# Patient Record
Sex: FEMALE | Race: BLACK OR AFRICAN AMERICAN | NOT HISPANIC OR LATINO | URBAN - METROPOLITAN AREA
[De-identification: names, ages, dates, MRNs, and addresses within clinical notes are randomized per-mention and may not be internally consistent; named-entity substitution may affect disease eponyms.]

---

## 2024-01-13 ENCOUNTER — CLINICAL SUPPORT (OUTPATIENT)
Dept: EMERGENCY MEDICINE | Facility: HOSPITAL | Age: 44
End: 2024-01-13

## 2024-01-13 ENCOUNTER — APPOINTMENT (OUTPATIENT)
Dept: RADIOLOGY | Facility: HOSPITAL | Age: 44
End: 2024-01-13

## 2024-01-13 ENCOUNTER — HOSPITAL ENCOUNTER (EMERGENCY)
Facility: HOSPITAL | Age: 44
Discharge: HOME | End: 2024-01-13
Attending: EMERGENCY MEDICINE

## 2024-01-13 VITALS
SYSTOLIC BLOOD PRESSURE: 124 MMHG | HEART RATE: 78 BPM | DIASTOLIC BLOOD PRESSURE: 62 MMHG | RESPIRATION RATE: 18 BRPM | TEMPERATURE: 98.2 F | HEIGHT: 66 IN | BODY MASS INDEX: 28.61 KG/M2 | OXYGEN SATURATION: 98 % | WEIGHT: 178 LBS

## 2024-01-13 DIAGNOSIS — B96.89 BACTERIAL VAGINOSIS: ICD-10-CM

## 2024-01-13 DIAGNOSIS — N93.9 ABNORMAL UTERINE BLEEDING: ICD-10-CM

## 2024-01-13 DIAGNOSIS — E04.1 THYROID NODULE: ICD-10-CM

## 2024-01-13 DIAGNOSIS — R10.9 BILATERAL FLANK PAIN: ICD-10-CM

## 2024-01-13 DIAGNOSIS — R55 SYNCOPE AND COLLAPSE: Primary | ICD-10-CM

## 2024-01-13 DIAGNOSIS — N76.0 BACTERIAL VAGINOSIS: ICD-10-CM

## 2024-01-13 LAB
ALBUMIN SERPL BCP-MCNC: 4 G/DL (ref 3.4–5)
ALP SERPL-CCNC: 62 U/L (ref 33–110)
ALT SERPL W P-5'-P-CCNC: 7 U/L (ref 7–45)
ANION GAP SERPL CALC-SCNC: 13 MMOL/L (ref 10–20)
APPEARANCE UR: CLEAR
AST SERPL W P-5'-P-CCNC: 9 U/L (ref 9–39)
BASOPHILS # BLD AUTO: 0.04 X10*3/UL (ref 0–0.1)
BASOPHILS NFR BLD AUTO: 0.4 %
BILIRUB SERPL-MCNC: 0.5 MG/DL (ref 0–1.2)
BILIRUB UR STRIP.AUTO-MCNC: NEGATIVE MG/DL
BUN SERPL-MCNC: 12 MG/DL (ref 6–23)
CALCIUM SERPL-MCNC: 9.8 MG/DL (ref 8.6–10.6)
CARDIAC TROPONIN I PNL SERPL HS: <3 NG/L (ref 0–34)
CARDIAC TROPONIN I PNL SERPL HS: <3 NG/L (ref 0–34)
CHLORIDE SERPL-SCNC: 106 MMOL/L (ref 98–107)
CLUE CELLS SPEC QL WET PREP: PRESENT
CO2 SERPL-SCNC: 21 MMOL/L (ref 21–32)
COLOR UR: ABNORMAL
CREAT SERPL-MCNC: 0.71 MG/DL (ref 0.5–1.05)
EGFRCR SERPLBLD CKD-EPI 2021: >90 ML/MIN/1.73M*2
EOSINOPHIL # BLD AUTO: 0.16 X10*3/UL (ref 0–0.7)
EOSINOPHIL NFR BLD AUTO: 1.5 %
ERYTHROCYTE [DISTWIDTH] IN BLOOD BY AUTOMATED COUNT: 13.1 % (ref 11.5–14.5)
GLUCOSE SERPL-MCNC: 104 MG/DL (ref 74–99)
GLUCOSE UR STRIP.AUTO-MCNC: NEGATIVE MG/DL
HCT VFR BLD AUTO: 34.6 % (ref 36–46)
HGB BLD-MCNC: 12.6 G/DL (ref 12–16)
IMM GRANULOCYTES # BLD AUTO: 0.02 X10*3/UL (ref 0–0.7)
IMM GRANULOCYTES NFR BLD AUTO: 0.2 % (ref 0–0.9)
KETONES UR STRIP.AUTO-MCNC: NEGATIVE MG/DL
LEUKOCYTE ESTERASE UR QL STRIP.AUTO: NEGATIVE
LIPASE SERPL-CCNC: 19 U/L (ref 9–82)
LYMPHOCYTES # BLD AUTO: 1.98 X10*3/UL (ref 1.2–4.8)
LYMPHOCYTES NFR BLD AUTO: 19 %
MAGNESIUM SERPL-MCNC: 1.93 MG/DL (ref 1.6–2.4)
MCH RBC QN AUTO: 29.4 PG (ref 26–34)
MCHC RBC AUTO-ENTMCNC: 36.4 G/DL (ref 32–36)
MCV RBC AUTO: 81 FL (ref 80–100)
MONOCYTES # BLD AUTO: 0.87 X10*3/UL (ref 0.1–1)
MONOCYTES NFR BLD AUTO: 8.3 %
MUCOUS THREADS #/AREA URNS AUTO: NORMAL /LPF
NEUTROPHILS # BLD AUTO: 7.35 X10*3/UL (ref 1.2–7.7)
NEUTROPHILS NFR BLD AUTO: 70.6 %
NITRITE UR QL STRIP.AUTO: NEGATIVE
NRBC BLD-RTO: 0 /100 WBCS (ref 0–0)
PH UR STRIP.AUTO: 5 [PH]
PLATELET # BLD AUTO: 303 X10*3/UL (ref 150–450)
POTASSIUM SERPL-SCNC: 3.8 MMOL/L (ref 3.5–5.3)
PREGNANCY TEST URINE, POC: NEGATIVE
PROT SERPL-MCNC: 8.1 G/DL (ref 6.4–8.2)
PROT UR STRIP.AUTO-MCNC: NEGATIVE MG/DL
RBC # BLD AUTO: 4.28 X10*6/UL (ref 4–5.2)
RBC # UR STRIP.AUTO: ABNORMAL /UL
RBC #/AREA URNS AUTO: NORMAL /HPF
SODIUM SERPL-SCNC: 136 MMOL/L (ref 136–145)
SP GR UR STRIP.AUTO: 1.01
T VAGINALIS SPEC QL WET PREP: ABNORMAL
TRICHOMONAS REFLEX COMMENT: ABNORMAL
TSH SERPL-ACNC: 1.48 MIU/L (ref 0.44–3.98)
UROBILINOGEN UR STRIP.AUTO-MCNC: <2 MG/DL
WBC # BLD AUTO: 10.4 X10*3/UL (ref 4.4–11.3)
WBC #/AREA URNS AUTO: NORMAL /HPF
WBC VAG QL WET PREP: ABNORMAL
YEAST VAG QL WET PREP: ABNORMAL

## 2024-01-13 PROCEDURE — 96374 THER/PROPH/DIAG INJ IV PUSH: CPT | Mod: 59

## 2024-01-13 PROCEDURE — 2500000005 HC RX 250 GENERAL PHARMACY W/O HCPCS

## 2024-01-13 PROCEDURE — 93005 ELECTROCARDIOGRAM TRACING: CPT

## 2024-01-13 PROCEDURE — 36415 COLL VENOUS BLD VENIPUNCTURE: CPT

## 2024-01-13 PROCEDURE — 2500000001 HC RX 250 WO HCPCS SELF ADMINISTERED DRUGS (ALT 637 FOR MEDICARE OP)

## 2024-01-13 PROCEDURE — 83735 ASSAY OF MAGNESIUM: CPT

## 2024-01-13 PROCEDURE — 96361 HYDRATE IV INFUSION ADD-ON: CPT

## 2024-01-13 PROCEDURE — 71275 CT ANGIOGRAPHY CHEST: CPT | Performed by: RADIOLOGY

## 2024-01-13 PROCEDURE — 85025 COMPLETE CBC W/AUTO DIFF WBC: CPT

## 2024-01-13 PROCEDURE — 2550000001 HC RX 255 CONTRASTS: Performed by: EMERGENCY MEDICINE

## 2024-01-13 PROCEDURE — 74177 CT ABD & PELVIS W/CONTRAST: CPT

## 2024-01-13 PROCEDURE — 71275 CT ANGIOGRAPHY CHEST: CPT

## 2024-01-13 PROCEDURE — 74177 CT ABD & PELVIS W/CONTRAST: CPT | Performed by: RADIOLOGY

## 2024-01-13 PROCEDURE — 96375 TX/PRO/DX INJ NEW DRUG ADDON: CPT | Mod: 59

## 2024-01-13 PROCEDURE — 87661 TRICHOMONAS VAGINALIS AMPLIF: CPT

## 2024-01-13 PROCEDURE — 81003 URINALYSIS AUTO W/O SCOPE: CPT

## 2024-01-13 PROCEDURE — 84443 ASSAY THYROID STIM HORMONE: CPT

## 2024-01-13 PROCEDURE — 80053 COMPREHEN METABOLIC PANEL: CPT

## 2024-01-13 PROCEDURE — 87800 DETECT AGNT MULT DNA DIREC: CPT

## 2024-01-13 PROCEDURE — 99285 EMERGENCY DEPT VISIT HI MDM: CPT

## 2024-01-13 PROCEDURE — 2500000004 HC RX 250 GENERAL PHARMACY W/ HCPCS (ALT 636 FOR OP/ED)

## 2024-01-13 PROCEDURE — 83690 ASSAY OF LIPASE: CPT

## 2024-01-13 PROCEDURE — 87210 SMEAR WET MOUNT SALINE/INK: CPT

## 2024-01-13 PROCEDURE — 84484 ASSAY OF TROPONIN QUANT: CPT

## 2024-01-13 PROCEDURE — 99285 EMERGENCY DEPT VISIT HI MDM: CPT | Performed by: EMERGENCY MEDICINE

## 2024-01-13 RX ORDER — METRONIDAZOLE 500 MG/1
TABLET ORAL
Status: COMPLETED
Start: 2024-01-13 | End: 2024-01-13

## 2024-01-13 RX ORDER — KETOROLAC TROMETHAMINE 10 MG/1
10 TABLET, FILM COATED ORAL EVERY 6 HOURS PRN
Qty: 20 TABLET | Refills: 0 | Status: SHIPPED | OUTPATIENT
Start: 2024-01-13 | End: 2024-01-18

## 2024-01-13 RX ORDER — METRONIDAZOLE 500 MG/1
500 TABLET ORAL ONCE
Status: COMPLETED | OUTPATIENT
Start: 2024-01-13 | End: 2024-01-13

## 2024-01-13 RX ORDER — KETOROLAC TROMETHAMINE 15 MG/ML
15 INJECTION, SOLUTION INTRAMUSCULAR; INTRAVENOUS ONCE
Status: COMPLETED | OUTPATIENT
Start: 2024-01-13 | End: 2024-01-13

## 2024-01-13 RX ORDER — MORPHINE SULFATE 4 MG/ML
4 INJECTION INTRAVENOUS ONCE
Status: COMPLETED | OUTPATIENT
Start: 2024-01-13 | End: 2024-01-13

## 2024-01-13 RX ORDER — LIDOCAINE 560 MG/1
2 PATCH PERCUTANEOUS; TOPICAL; TRANSDERMAL DAILY
Status: DISCONTINUED | OUTPATIENT
Start: 2024-01-13 | End: 2024-01-13 | Stop reason: HOSPADM

## 2024-01-13 RX ORDER — CYCLOBENZAPRINE HCL 10 MG
5 TABLET ORAL ONCE
Status: COMPLETED | OUTPATIENT
Start: 2024-01-13 | End: 2024-01-13

## 2024-01-13 RX ORDER — KETOROLAC TROMETHAMINE 15 MG/ML
INJECTION, SOLUTION INTRAMUSCULAR; INTRAVENOUS
Status: COMPLETED
Start: 2024-01-13 | End: 2024-01-13

## 2024-01-13 RX ORDER — CYCLOBENZAPRINE HCL 5 MG
5 TABLET ORAL 3 TIMES DAILY
Qty: 30 TABLET | Refills: 0 | Status: SHIPPED | OUTPATIENT
Start: 2024-01-13 | End: 2024-01-23

## 2024-01-13 RX ORDER — METRONIDAZOLE 500 MG/1
500 TABLET ORAL 2 TIMES DAILY
Qty: 14 TABLET | Refills: 0 | Status: SHIPPED | OUTPATIENT
Start: 2024-01-13 | End: 2024-01-20

## 2024-01-13 RX ORDER — LIDOCAINE 50 MG/G
1 PATCH TOPICAL DAILY
Qty: 10 PATCH | Refills: 0 | Status: SHIPPED | OUTPATIENT
Start: 2024-01-13

## 2024-01-13 RX ADMIN — KETOROLAC TROMETHAMINE 15 MG: 15 INJECTION, SOLUTION INTRAMUSCULAR; INTRAVENOUS at 14:35

## 2024-01-13 RX ADMIN — IOHEXOL 100 ML: 350 INJECTION, SOLUTION INTRAVENOUS at 16:01

## 2024-01-13 RX ADMIN — SODIUM CHLORIDE 1000 ML: 9 INJECTION, SOLUTION INTRAVENOUS at 14:45

## 2024-01-13 RX ADMIN — CYCLOBENZAPRINE 5 MG: 10 TABLET, FILM COATED ORAL at 14:36

## 2024-01-13 RX ADMIN — METRONIDAZOLE 500 MG: 500 TABLET, FILM COATED ORAL at 18:20

## 2024-01-13 RX ADMIN — MORPHINE SULFATE 4 MG: 4 INJECTION INTRAVENOUS at 15:40

## 2024-01-13 RX ADMIN — LIDOCAINE 2 PATCH: 4 PATCH TOPICAL at 14:36

## 2024-01-13 RX ADMIN — METRONIDAZOLE 500 MG: 500 TABLET ORAL at 18:20

## 2024-01-13 ASSESSMENT — LIFESTYLE VARIABLES
HAVE PEOPLE ANNOYED YOU BY CRITICIZING YOUR DRINKING: NO
EVER FELT BAD OR GUILTY ABOUT YOUR DRINKING: NO
EVER HAD A DRINK FIRST THING IN THE MORNING TO STEADY YOUR NERVES TO GET RID OF A HANGOVER: NO
HAVE YOU EVER FELT YOU SHOULD CUT DOWN ON YOUR DRINKING: NO
REASON UNABLE TO ASSESS: NO

## 2024-01-13 ASSESSMENT — COLUMBIA-SUICIDE SEVERITY RATING SCALE - C-SSRS
2. HAVE YOU ACTUALLY HAD ANY THOUGHTS OF KILLING YOURSELF?: NO
1. IN THE PAST MONTH, HAVE YOU WISHED YOU WERE DEAD OR WISHED YOU COULD GO TO SLEEP AND NOT WAKE UP?: NO
6. HAVE YOU EVER DONE ANYTHING, STARTED TO DO ANYTHING, OR PREPARED TO DO ANYTHING TO END YOUR LIFE?: NO

## 2024-01-13 NOTE — ED TRIAGE NOTES
Pt c/o back pain, states she fell while bending over at work the other day.  Pt states she was walking to the bathroom when she passed out and woke up to her dog licking her face

## 2024-01-13 NOTE — ED PROVIDER NOTES
HPI   Chief Complaint   Patient presents with    Back Pain       This patient is a 43-year-old female presenting today for bilateral flank pain.  Reports that 2 days ago, was at work bent down to  a glove at work when she suddenly experienced acute onset of bilateral flank pain.  Denies any midline back pain, no shooting pain down her legs, no loss of bowel.  Denies any urinary retention.  Reports that she has had lumbar sprains in the past but she notes that this does not feel similar.  As noted suprapubic pressure but no pain.  Notes that the pain gets so bad that she is reportedly passed out twice today and had 1 presyncopal episode.  Denies hitting her head or being on any anticoagulants.  Denies any neck pain.  Notes that prior to passing out, she had no presyncopal symptoms including lightheadedness or dizziness.  Denies any nausea vomiting diarrhea or constipation.  Reports that for the past 2 days, she has felt extremely cold but no fevers.  Denying any body aches.  Does note that the pain gets so bad that she feels short of breath.  No chest pain.  Notes that 3 weeks ago, she did have COVID, though she reports all of her symptoms have since resolved except for a productive cough.  Notes that her urine has been darker than normal.  Denies dysuria, though notes that she has had increased urinary frequency and has accidentally wet herself since then.  Reports that her urine has an odor now.  Denies any vaginal discharge, though she does report that she is due to have her period started at the end of the month, though reports she has had brown vaginal spotting over the past few days.                          [place] Coma Scale Score: 15                  Patient History   No past medical history on file.  No past surgical history on file.  No family history on file.  Social History     Tobacco Use    Smoking status: Not on file    Smokeless tobacco: Not on file   Substance Use Topics    Alcohol use: Not  on file    Drug use: Not on file       Physical Exam   ED Triage Vitals [01/13/24 1327]   Temp Heart Rate Resp BP   36.8 °C (98.2 °F) 95 16 123/79      SpO2 Temp Source Heart Rate Source Patient Position   98 % Tympanic -- --      BP Location FiO2 (%)     -- --       Physical Exam  Vitals and nursing note reviewed. Exam conducted with a chaperone present.   Constitutional:       General: She is not in acute distress.     Appearance: Normal appearance. She is not ill-appearing.   HENT:      Head: Normocephalic and atraumatic.      Right Ear: External ear normal.      Left Ear: External ear normal.      Nose: Nose normal. No congestion or rhinorrhea.      Mouth/Throat:      Mouth: Mucous membranes are moist.      Pharynx: Oropharynx is clear. No oropharyngeal exudate or posterior oropharyngeal erythema.   Eyes:      Extraocular Movements: Extraocular movements intact.      Conjunctiva/sclera: Conjunctivae normal.      Pupils: Pupils are equal, round, and reactive to light.   Cardiovascular:      Rate and Rhythm: Normal rate and regular rhythm.      Heart sounds: Normal heart sounds.   Pulmonary:      Effort: No accessory muscle usage or respiratory distress.      Breath sounds: Normal breath sounds. No wheezing, rhonchi or rales.   Abdominal:      General: Abdomen is flat. Bowel sounds are normal. There is no distension.      Palpations: Abdomen is soft.      Tenderness: There is abdominal tenderness in the suprapubic area. There is right CVA tenderness and left CVA tenderness.   Genitourinary:     Labia:         Right: No rash or lesion.         Left: No rash or lesion.       Vagina: Bleeding present. No vaginal discharge, erythema, tenderness or lesions.      Cervix: No cervical motion tenderness, discharge or cervical bleeding.      Uterus: Normal.       Adnexa:         Right: No mass or tenderness.          Left: No mass or tenderness.        Rectum: Normal.   Musculoskeletal:         General: No swelling or  deformity. Normal range of motion.      Cervical back: Normal range of motion and neck supple. No bony tenderness. No pain with movement.      Thoracic back: No bony tenderness.      Lumbar back: No bony tenderness. Negative right straight leg raise test and negative left straight leg raise test.      Right lower leg: No edema.      Left lower leg: No edema.   Skin:     General: Skin is warm and dry.      Capillary Refill: Capillary refill takes less than 2 seconds.   Neurological:      General: No focal deficit present.      Mental Status: She is alert and oriented to person, place, and time.      GCS: GCS eye subscore is 4. GCS verbal subscore is 5. GCS motor subscore is 6.      Cranial Nerves: Cranial nerves 2-12 are intact.      Sensory: No sensory deficit.      Motor: Motor function is intact. No weakness.   Psychiatric:         Mood and Affect: Mood and affect normal.         Speech: Speech normal.         Behavior: Behavior normal. Behavior is cooperative.         ED Course & MDM   ED Course as of 01/13/24 1820   Sat Jan 13, 2024   1435 Preg Test, Ur: Negative [ML]   1443 LIPASE: 19  wnl [ML]   1443 MAGNESIUM: 1.93  wnl [ML]   1443 Comprehensive metabolic panel(!)  No electrolyte abnormality, LUISA or elevated LFTs [ML]   1524 Blood, Urine(!): SMALL (1+) [ML]   1602 Troponin I, High Sensitivity: <3  Wnl, low concern for ACS [ML]   1625 ECG 12 lead  EKG shows a normal rate and rhythm, normal axis, normal intervals. And normal ST pattern with no evidence of acute ischemia or other acute findings.  Flattened T waves in leads III, aVF and V3 through V6.  No previous EKG to compare to.  71 bpm.  QTc 397. [ML]   1634 Clue Cells(!): Present [ML]   1653 CT angio chest for pulmonary embolism  1.  No evidence of acute pulmonary embolism.  2. No acute thoracic abnormality.  3. Incidental 7 mm hypodense nodule in the right thyroid lobe.  4. Please see separately dictated CT of the abdomen and pelvis for  further details  regarding the abdominopelvic findings.   [ML]   1653 CT abdomen pelvis w IV contrast  1. No acute abnormality within the abdomen or pelvis.  2. Small volume free pelvic fluid, likely physiologic.  The kidneys are normal in size and enhance symmetrically.  No  hydroureteronephrosis or nephroureterolithiasis is identified.      There is a small fat  containing umbilical hernia. There is bandlike subcutaneous scarring  across the anterior abdominal wall.   [ML]   1729 TSH with reflex to Free T4 if abnormal  wnl [ML]   1758 Troponin I, High Sensitivity  Two negative troponins [ML]      ED Course User Index  [ML] Rosa Fuentes PA-C         Diagnoses as of 01/13/24 1820   Syncope and collapse   Bacterial vaginosis   Abnormal uterine bleeding   Bilateral flank pain   Thyroid nodule       Medical Decision Making  43-year-old female presenting today for syncope and bilateral flank pain.  Endorsing suprapubic pain which was reproducible on palpation.  She does have CVA tenderness bilaterally.  Denies any pain on exam of her midline C-spine, T-spine or L-spine so I have lower concern that this is spinal related.  Notes that she accidentally urinated herself once, though denies any urinary retention or loss of bowel to be concern for cauda equina.  No saddle anesthesia fever or chills to be concern for epidural abscess.  Does endorse a history of COVID 3 months ago and reports pain with deep inspiration and productive cough that has been present.  Differentials for this could include PE versus pneumonia.  Will get CT PE chest scan to rule both differentials out.  Will also get CT abdomen pelvis to rule out nephrolithiasis as she does endorse blood in her urine versus vaginal bleeding.  Could also have pyelonephritis which could explain why she is having suprapubic abdominal pain with the CVA tenderness so we will get urinalysis to rule this out as well as abdominal labs.  Did endorse 3 times today due to pain.  This could be  more vasovagal, though I did get troponins and an EKG to rule this out.  This could also be due to a PE.  Considering she believes that the blood could be due to vaginal bleeding, I did perform a  exam to rule out ectopic versus TOA versus ovarian torsion which could explain the passing out.  Urine pregnancy was negative.  He states that she is low suspicion for STDs as she is engaged, though she is unsure if he is faithful.  Also got STD swabs.   exam by no vaginal discharge or adnexal pain or masses bilaterally.  Deferring pelvic ultrasound at this time.  She did note an odor.  Wet prep came back positive for BV and will be prescribed for Flagyl.  This could explain the suprapubic discomfort.    Was given IV fluids, Toradol, morphine, Flexeril and lidocaine patches for symptomatic relief.  She reports feeling better after all of this medication.    CT PE chest showing no pneumonia or PE or any other acute cardiopulmonary processes.  There was an incidental finding of a thyroid nodule.  I added on a TSH which came back within normal limits, so I did notify her of the results and told her to follow-up with her PCP for thyroid ultrasound.  No acute abdomen/pelvic showing up on scans.  Urine was negative for infection or blood.  CBC and CMP were all within normal limits.  Double troponin is negative with a nonischemic concern for ACS.  Considering she is feeling better at this time, will treat this as more of a vasovagal from pain and a musculoskeletal strain in her lumbar spine considering this all happened after she bent over and had this intense pain.  Will send her home with lidocaine patches, Toradol and Flexeril for symptomatic control.  I did give her strict return precautions including worsening symptoms, fevers or continued passing out.  I gave her instructions to with her PCP over the next few days for follow-up as well as follow-up for her thyroid nodule.  At this time, she is stable and okay for  discharge.        Procedure  Procedures     Rosa Fuentes PA-C  01/13/24 1823

## 2024-01-13 NOTE — Clinical Note
Laya Cloud was seen and treated in our emergency department on 1/13/2024.  She may return to work on 01/17/2024.       If you have any questions or concerns, please don't hesitate to call.      Kole Duff MD

## 2024-01-13 NOTE — DISCHARGE INSTRUCTIONS
If you notice worse pain, fever else, if you knew passing out, please come back to the follow-up with your PCP within the you days.  Generally, there was a finding of a thyroid nodule.  Please follow-up with your PCP about this as well.  Likely, they will order a thyroid ultrasound.  All of your scans and blood work were negative.  Considering the back pain started after you bent over, lets treat this as if it were a very severe muscular strain.  Sending you home with lidocaine patches, Toradol and muscle relaxers to help with symptomatic control.  Do not take muscle relaxers when you drive as it can make you drowsy.  Your swabs did come back positive for BV, which is an imbalance of bacteria in your vagina and not an STD.  Please take Flagyl twice a day for 7 days to treat this.  Do not drink alcohol on this medication as it can make you sick.

## 2024-01-14 LAB
ATRIAL RATE: 71 BPM
ATRIAL RATE: 76 BPM
C TRACH RRNA SPEC QL NAA+PROBE: NEGATIVE
HOLD SPECIMEN: NORMAL
N GONORRHOEA DNA SPEC QL PROBE+SIG AMP: NEGATIVE
P AXIS: 44 DEGREES
P OFFSET: 193 MS
P OFFSET: 201 MS
P ONSET: 137 MS
P ONSET: 152 MS
PR INTERVAL: 150 MS
PR INTERVAL: 170 MS
Q ONSET: 222 MS
Q ONSET: 227 MS
QRS COUNT: 11 BEATS
QRS COUNT: 13 BEATS
QRS DURATION: 66 MS
QRS DURATION: 72 MS
QT INTERVAL: 366 MS
QT INTERVAL: 370 MS
QTC CALCULATION(BAZETT): 397 MS
QTC CALCULATION(BAZETT): 416 MS
QTC FREDERICIA: 387 MS
QTC FREDERICIA: 400 MS
R AXIS: 147 DEGREES
R AXIS: 35 DEGREES
T AXIS: 150 DEGREES
T AXIS: 21 DEGREES
T OFFSET: 405 MS
T OFFSET: 412 MS
T VAGINALIS RRNA SPEC QL NAA+PROBE: NEGATIVE
VENTRICULAR RATE: 71 BPM
VENTRICULAR RATE: 76 BPM

## 2024-09-04 ENCOUNTER — APPOINTMENT (OUTPATIENT)
Dept: RADIOLOGY | Facility: HOSPITAL | Age: 44
End: 2024-09-04

## 2024-09-04 ENCOUNTER — HOSPITAL ENCOUNTER (EMERGENCY)
Facility: HOSPITAL | Age: 44
Discharge: HOME | End: 2024-09-04

## 2024-09-04 VITALS
OXYGEN SATURATION: 98 % | WEIGHT: 190 LBS | TEMPERATURE: 98.1 F | HEIGHT: 66 IN | HEART RATE: 93 BPM | RESPIRATION RATE: 16 BRPM | DIASTOLIC BLOOD PRESSURE: 64 MMHG | SYSTOLIC BLOOD PRESSURE: 124 MMHG | BODY MASS INDEX: 30.53 KG/M2

## 2024-09-04 DIAGNOSIS — M25.571 ACUTE RIGHT ANKLE PAIN: Primary | ICD-10-CM

## 2024-09-04 DIAGNOSIS — M79.671 FOOT PAIN, RIGHT: ICD-10-CM

## 2024-09-04 PROCEDURE — 99284 EMERGENCY DEPT VISIT MOD MDM: CPT

## 2024-09-04 PROCEDURE — 73630 X-RAY EXAM OF FOOT: CPT | Mod: RT

## 2024-09-04 PROCEDURE — 2500000001 HC RX 250 WO HCPCS SELF ADMINISTERED DRUGS (ALT 637 FOR MEDICARE OP)

## 2024-09-04 PROCEDURE — 73630 X-RAY EXAM OF FOOT: CPT | Mod: RIGHT SIDE | Performed by: STUDENT IN AN ORGANIZED HEALTH CARE EDUCATION/TRAINING PROGRAM

## 2024-09-04 PROCEDURE — 73610 X-RAY EXAM OF ANKLE: CPT | Mod: RT

## 2024-09-04 PROCEDURE — 73610 X-RAY EXAM OF ANKLE: CPT | Mod: RIGHT SIDE | Performed by: STUDENT IN AN ORGANIZED HEALTH CARE EDUCATION/TRAINING PROGRAM

## 2024-09-04 RX ORDER — CYCLOBENZAPRINE HCL 10 MG
10 TABLET ORAL 2 TIMES DAILY PRN
Qty: 10 TABLET | Refills: 0 | Status: SHIPPED | OUTPATIENT
Start: 2024-09-04 | End: 2024-09-09

## 2024-09-04 RX ORDER — CYCLOBENZAPRINE HCL 10 MG
5 TABLET ORAL ONCE
Status: COMPLETED | OUTPATIENT
Start: 2024-09-04 | End: 2024-09-04

## 2024-09-04 RX ADMIN — CYCLOBENZAPRINE 5 MG: 10 TABLET, FILM COATED ORAL at 09:52

## 2024-09-04 ASSESSMENT — LIFESTYLE VARIABLES
EVER HAD A DRINK FIRST THING IN THE MORNING TO STEADY YOUR NERVES TO GET RID OF A HANGOVER: NO
EVER FELT BAD OR GUILTY ABOUT YOUR DRINKING: NO
HAVE YOU EVER FELT YOU SHOULD CUT DOWN ON YOUR DRINKING: NO
TOTAL SCORE: 0
HAVE PEOPLE ANNOYED YOU BY CRITICIZING YOUR DRINKING: NO

## 2024-09-04 ASSESSMENT — PAIN DESCRIPTION - LOCATION: LOCATION: ANKLE

## 2024-09-04 ASSESSMENT — COLUMBIA-SUICIDE SEVERITY RATING SCALE - C-SSRS
1. IN THE PAST MONTH, HAVE YOU WISHED YOU WERE DEAD OR WISHED YOU COULD GO TO SLEEP AND NOT WAKE UP?: NO
6. HAVE YOU EVER DONE ANYTHING, STARTED TO DO ANYTHING, OR PREPARED TO DO ANYTHING TO END YOUR LIFE?: NO
2. HAVE YOU ACTUALLY HAD ANY THOUGHTS OF KILLING YOURSELF?: NO

## 2024-09-04 ASSESSMENT — PAIN SCALES - GENERAL: PAINLEVEL_OUTOF10: 8

## 2024-09-04 ASSESSMENT — PAIN DESCRIPTION - ORIENTATION: ORIENTATION: RIGHT

## 2024-09-04 ASSESSMENT — PAIN - FUNCTIONAL ASSESSMENT: PAIN_FUNCTIONAL_ASSESSMENT: 0-10

## 2024-09-04 NOTE — DISCHARGE INSTRUCTIONS
Your x-ray was not definitive.  We recommend you follow-up with a podiatrist as soon as possible.  Please call Dr. Tesfaye and make an appointment.  Please remain nonweightbearing on that foot.

## 2024-09-04 NOTE — ED PROVIDER NOTES
HPI   Chief Complaint   Patient presents with    Foot Injury       Patient is a 43-year-old female presents with right ankle and foot pain after rolling it going down the steps last night.  Patient states that felt okay last night, but then this morning she feels like she cannot bear weight on it as well.  Has been able to bear weight, but states it is painful.  Endorses full range of motion, but states it is painful.  States she took a couple Aleve today, seem to help the pain.  Denies any significant swelling.  No numbness or tingling.              Patient History   History reviewed. No pertinent past medical history.  History reviewed. No pertinent surgical history.  No family history on file.  Social History     Tobacco Use    Smoking status: Never    Smokeless tobacco: Never   Substance Use Topics    Alcohol use: Not on file    Drug use: Not on file       Physical Exam   ED Triage Vitals [09/04/24 0932]   Temperature Heart Rate Respirations BP   36.7 °C (98.1 °F) 93 16 124/64      Pulse Ox Temp src Heart Rate Source Patient Position   98 % -- -- --      BP Location FiO2 (%)     -- --       Physical Exam  Vitals and nursing note reviewed.   Constitutional:       Appearance: Normal appearance.   HENT:      Head: Normocephalic and atraumatic.      Right Ear: External ear normal.      Left Ear: External ear normal.      Nose: Nose normal.      Mouth/Throat:      Mouth: Mucous membranes are moist.      Pharynx: Oropharynx is clear.   Eyes:      Extraocular Movements: Extraocular movements intact.      Conjunctiva/sclera: Conjunctivae normal.      Pupils: Pupils are equal, round, and reactive to light.   Cardiovascular:      Rate and Rhythm: Normal rate and regular rhythm.      Pulses: Normal pulses.      Heart sounds: Normal heart sounds.   Pulmonary:      Effort: Pulmonary effort is normal. No respiratory distress.      Breath sounds: Normal breath sounds. No wheezing.   Abdominal:      General: Abdomen is flat.  Bowel sounds are normal.      Palpations: Abdomen is soft.      Tenderness: There is no abdominal tenderness. There is no guarding or rebound.   Musculoskeletal:         General: Tenderness present. No deformity. Normal range of motion.      Cervical back: Normal range of motion and neck supple. No tenderness.      Right lower leg: No edema.      Left lower leg: No edema.      Comments: Tenderness to palpation to dorsal lateral portion of foot, and lateral malleoli.  No proximal tib-fib tenderness.  No loss of range of motion.   Skin:     General: Skin is warm and dry.      Capillary Refill: Capillary refill takes less than 2 seconds.   Neurological:      General: No focal deficit present.      Mental Status: She is alert and oriented to person, place, and time. Mental status is at baseline.   Psychiatric:         Mood and Affect: Mood normal.         Behavior: Behavior normal.         Thought Content: Thought content normal.         Judgment: Judgment normal.           ED Course & MDM   Diagnoses as of 09/04/24 1130   Acute right ankle pain   Foot pain, right                 No data recorded     [place] Coma Scale Score: 15 (09/04/24 0934 : Lan Jovel RN)                           Medical Decision Making  Patient presents after a fall/ankle inversion.  On exam she is well-appearing, afebrile, dynamically stable.  On exam patient does have some slight tenderness on the lateral malleoli and the lateral portion of the dorsal area of the foot just below the malleoli.  Does show good range of motion of the foot and the ankle.  No tenderness around the Achilles area, low suspicion for any Achilles tear.  Patient is able to ambulate, but it is painful.  Will get plain films, although there is no significant swelling.  Patient has already taken Aleve, will medicate with Flexeril.    Patient's x-ray showed questionable sequela injury.    Mentioned possible well cortical weighted ossific density adjacent to the cuboid and  fifth metatarsal base.  Did not appreciate much point tenderness to that area, patient endorsed more proximal on the dorsal lateral region.  But even then not very point tender.  Discussed placing the patient in a short leg splint, as we do not have any walking boots here in the emergency department.  Patient however declined, and wanted to go with the postop shoe.  Placed in Ace wrap for possible ankle sprain as well as the postop shoe.  Gave the patient very strict nonweightbearing instructions until she can follow-up with orthopedic/podiatrist.  Patient verbalized understanding to this.    Stressed follow-up and return precautions.    Patient was instructed to follow up with their primary care doctor and to return to the ED if their symptoms return or worsen. Patient verbalized understanding of ED return precautions and follow up. All discharge instructions explained to patient and all questions answered. Patient agreeable to discharge and plan of action. Patient stable on discharge.          Procedure  Procedures     Kota Dobson PA-C  09/04/24 1131

## 2024-09-04 NOTE — ED NOTES
Discharge paperwork gone over with pt. Education provided and prescriptions (if applicable) were given to pt. Pt IV removed and there is no bleeding at the site of removal. Pt was ambulatory out of ED independently. Pt has no further questions or concerns at this time. Treatment complete.        Kelvin Brown RN  09/04/24 8151